# Patient Record
Sex: MALE | Race: BLACK OR AFRICAN AMERICAN | NOT HISPANIC OR LATINO | URBAN - METROPOLITAN AREA
[De-identification: names, ages, dates, MRNs, and addresses within clinical notes are randomized per-mention and may not be internally consistent; named-entity substitution may affect disease eponyms.]

---

## 2020-01-25 ENCOUNTER — EMERGENCY (EMERGENCY)
Facility: HOSPITAL | Age: 30
LOS: 1 days | Discharge: HOME | End: 2020-01-25
Attending: EMERGENCY MEDICINE | Admitting: EMERGENCY MEDICINE
Payer: MEDICAID

## 2020-01-25 VITALS
HEIGHT: 73 IN | WEIGHT: 167.99 LBS | HEART RATE: 71 BPM | TEMPERATURE: 98 F | SYSTOLIC BLOOD PRESSURE: 125 MMHG | RESPIRATION RATE: 16 BRPM | DIASTOLIC BLOOD PRESSURE: 60 MMHG | OXYGEN SATURATION: 98 %

## 2020-01-25 VITALS
DIASTOLIC BLOOD PRESSURE: 71 MMHG | SYSTOLIC BLOOD PRESSURE: 122 MMHG | OXYGEN SATURATION: 100 % | RESPIRATION RATE: 18 BRPM | HEART RATE: 62 BPM

## 2020-01-25 PROCEDURE — 99152 MOD SED SAME PHYS/QHP 5/>YRS: CPT

## 2020-01-25 PROCEDURE — 23650 CLTX SHO DSLC W/MNPJ WO ANES: CPT | Mod: 54

## 2020-01-25 PROCEDURE — 73030 X-RAY EXAM OF SHOULDER: CPT | Mod: 26,LT,76

## 2020-01-25 PROCEDURE — 99284 EMERGENCY DEPT VISIT MOD MDM: CPT | Mod: 25,57

## 2020-01-25 PROCEDURE — 73030 X-RAY EXAM OF SHOULDER: CPT | Mod: 26,LT

## 2020-01-25 RX ORDER — MORPHINE SULFATE 50 MG/1
4 CAPSULE, EXTENDED RELEASE ORAL ONCE
Refills: 0 | Status: DISCONTINUED | OUTPATIENT
Start: 2020-01-25 | End: 2020-01-25

## 2020-01-25 RX ORDER — PROPOFOL 10 MG/ML
50 INJECTION, EMULSION INTRAVENOUS ONCE
Refills: 0 | Status: COMPLETED | OUTPATIENT
Start: 2020-01-25 | End: 2020-01-25

## 2020-01-25 RX ORDER — SODIUM CHLORIDE 9 MG/ML
1000 INJECTION INTRAMUSCULAR; INTRAVENOUS; SUBCUTANEOUS ONCE
Refills: 0 | Status: COMPLETED | OUTPATIENT
Start: 2020-01-25 | End: 2020-01-25

## 2020-01-25 RX ORDER — PROPOFOL 10 MG/ML
150 INJECTION, EMULSION INTRAVENOUS ONCE
Refills: 0 | Status: COMPLETED | OUTPATIENT
Start: 2020-01-25 | End: 2020-01-25

## 2020-01-25 RX ADMIN — SODIUM CHLORIDE 1000 MILLILITER(S): 9 INJECTION INTRAMUSCULAR; INTRAVENOUS; SUBCUTANEOUS at 16:10

## 2020-01-25 RX ADMIN — MORPHINE SULFATE 4 MILLIGRAM(S): 50 CAPSULE, EXTENDED RELEASE ORAL at 16:10

## 2020-01-25 RX ADMIN — MORPHINE SULFATE 4 MILLIGRAM(S): 50 CAPSULE, EXTENDED RELEASE ORAL at 16:47

## 2020-01-25 RX ADMIN — PROPOFOL 50 MILLIGRAM(S): 10 INJECTION, EMULSION INTRAVENOUS at 16:15

## 2020-01-25 RX ADMIN — PROPOFOL 150 MILLIGRAM(S): 10 INJECTION, EMULSION INTRAVENOUS at 16:13

## 2020-01-25 NOTE — ED PROVIDER NOTE - CLINICAL SUMMARY MEDICAL DECISION MAKING FREE TEXT BOX
30 y/o male presents with left shoulder pain and deformity, notes hx of prior shoulder dislocations in the past. Will obtain x-ray to assess for dislocation and provide IV Morphine for pain control. Will consent patient for conscious sedation for reduction of shoulder dislocation. 28 y/o male presents with left shoulder pain and deformity, notes hx of prior shoulder dislocations in the past. Will obtain x-ray to assess for dislocation and provide Morphine for pain control. Will consent patient for conscious sedation for reduction of shoulder dislocation.

## 2020-01-25 NOTE — ED PROVIDER NOTE - CARE PROVIDER_API CALL
Kristian Key)  [facility]  Orthopedics  [address], [address] [place] [address]  Phone: [phone]  Fax: [phone]  Follow Up Time:

## 2020-01-25 NOTE — ED ADULT TRIAGE NOTE - CHIEF COMPLAINT QUOTE
BIBA as per ems patient stated that he was stretching on a steering wheel and dislocated his left shoulder, has occurred multiple times previously and was able to reduce his own shoulder without any medical intervention, unfortunately unable to reduce it himself today.

## 2020-01-25 NOTE — ED PROVIDER NOTE - PROGRESS NOTE DETAILS
reduced shoulder dislocation, monitoring completed, pt now alert, oriented, placed in a sling., Recommend Ortho f/u

## 2020-01-25 NOTE — ED PROVIDER NOTE - OBJECTIVE STATEMENT
28 y/o LHD male with no significant PMHx presents to ED c/o left shoulder pain and dislocation. Patient reports he was stretching today when he felt his left shoulder dislocate. Reports left shoulder pain since then. Denies any numbness, tingling, weakness, or recent direct trauma or falls. States left shoulder has dislocated multiple times in the past but he is usually able to "put it back into place." Patient did not take any pain meds PTA.

## 2020-01-25 NOTE — ED PROVIDER NOTE - DIAGNOSTIC INTERPRETATION
Interpreted by ED physician  Shoulder x-ray, 2 views  No fracture, + anterior shoulder dislocation  no Foreign Body noted, soft tissue normal    Repeat xray - reduced shoulder dislocation

## 2020-01-25 NOTE — ED PROCEDURE NOTE - NS_POSTPROCCAREGUIDE_ED_ALL_ED
Patient is now fully awake, with vital signs and temperature stable, hydration is adequate, patients Laurel’s  score is at baseline (or greater than 8), patient and escort has received  discharge education.

## 2020-01-25 NOTE — ED PROVIDER NOTE - PATIENT PORTAL LINK FT
You can access the FollowMyHealth Patient Portal offered by [facility] by registering at the following website: [email or web address]. By joining [employer]’s FollowMyHealth portal, you will also be able to view your health information using other applications (apps) compatible with our system.

## 2020-01-30 DIAGNOSIS — S43.005A UNSPECIFIED DISLOCATION OF LEFT SHOULDER JOINT, INITIAL ENCOUNTER: ICD-10-CM
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
## 2020-01-30 DIAGNOSIS — M25.512 PAIN IN LEFT SHOULDER: ICD-10-CM
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
